# Patient Record
(demographics unavailable — no encounter records)

---

## 2025-03-20 NOTE — PHYSICAL EXAM
[Alert] : alert [Normal Voice/Communication] : normal voice/communication [Healthy Appearing] : healthy appearing [No Acute Distress] : no acute distress [Sclera] : the sclera and conjunctiva were normal [Hearing Threshold Finger Rub Not [place]] : hearing was normal [Normal Lips/Gums] : the lips and gums were normal [Oropharynx] : the oropharynx was normal [Normal Appearance] : the appearance of the neck was normal [No Neck Mass] : no neck mass was observed [No Respiratory Distress] : no respiratory distress [No Acc Muscle Use] : no accessory muscle use [Respiration, Rhythm And Depth] : normal respiratory rhythm and effort [Auscultation Breath Sounds / Voice Sounds] : lungs were clear to auscultation bilaterally [Heart Rate And Rhythm] : heart rate was normal and rhythm regular [Normal S1, S2] : normal S1 and S2 [Murmurs] : no murmurs [Bowel Sounds] : normal bowel sounds [Abdomen Tenderness] : non-tender [No Masses] : no abdominal mass palpated [Abdomen Soft] : soft [] : no hepatosplenomegaly [Oriented To Time, Place, And Person] : oriented to person, place, and time [de-identified] : Large frame and build.  Obese.  In no distress. [FreeTextEntry1] : Anicteric sclera. [de-identified] : Moist mucosa. [de-identified] : Supple neck.  No adenopathy. [de-identified] : Clear to A&P. [de-identified] : No MRG. [de-identified] : No cyanosis clubbing or edema. [de-identified] : Obese.  Bowel sounds present.  No organomegaly.  No mass tenderness or rebound.  Surgical scars noted. [de-identified] : Not performed today.

## 2025-03-20 NOTE — ASSESSMENT
[FreeTextEntry1] : Predominantly diarrhea.  Moderate weight loss.  Negative imaging.  Negative prior blood work.  Unlikely to be C. difficile.  Possible medication related diarrhea, i.e. metformin. Advise stool studies to include fecal calprotectin, ova and parasites, C. difficile, GI PCR. Repeat blood work to include CBC, CMP, CRP, IBD panel, TSH with reflex, celiac disease antibody profile. GI office follow-up here in 2 months.

## 2025-03-20 NOTE — REASON FOR VISIT
[Follow-up] : a follow-up of an existing diagnosis [FreeTextEntry1] : Functional heartburn, diarrhea with constipation, weight loss.

## 2025-03-20 NOTE — HISTORY OF PRESENT ILLNESS
[de-identified] : 1/22   FK normal study.  Pathology all negative.  No H. pylori.  No intestinal metaplasia.  [FreeTextEntry1] : Colonoscopy 1/22.  Small 5 mm tubular adenoma removed from cecum.

## 2025-04-22 NOTE — DISCUSSION/SUMMARY
[EKG obtained to assist in diagnosis and management of assessed problem(s)] : EKG obtained to assist in diagnosis and management of assessed problem(s) [FreeTextEntry1] : 1. PFO closure: Discontinue asa due to ITP and risk of bleeding. TTE 2. Palpitations: no malignant features, she reports that she has them once per year.  3. Negative cardiac CTA.  Patient with snoring and frequent wake ups. Underwent sleep study. home. Watch pat.  Follow up in 1.5 years.

## 2025-04-22 NOTE — CARDIOLOGY SUMMARY
[de-identified] : 4/2024- SR  2/2024- SR 67bpm 2/2022- Sinus with no st-t wave abnormalities.  [de-identified] : 5/2023 CTA: calcium score 0

## 2025-04-22 NOTE — CARDIOLOGY SUMMARY
[de-identified] : 4/2024- SR  2/2024- SR 67bpm 2/2022- Sinus with no st-t wave abnormalities.  [de-identified] : 5/2023 CTA: calcium score 0

## 2025-04-22 NOTE — HISTORY OF PRESENT ILLNESS
[FreeTextEntry1] : Patient  with DM II, hyperlipidemia, TIA presenting to the office due to an abnormal TCD bubble study.  7/2014- Had difficulty breathing, difficulty speech- went to [facility] and underwent workup. Unclear if noted by MRI but patient has clinical symptoms of CVA. Improved symptoms during hospital stay. No residual deficit except mild memory loss. S/p PFO closure.  10/2019- Stable. No chest pain. Some bruising.   2/2024- No symptoms. No palpitations. Cardiac CTA negative last year.   4/2025- No cardiac symptoms. She reports occasional acid reflux.

## 2025-04-22 NOTE — END OF VISIT
[FreeTextEntry4] :  I, Ailin Delgado, am scribing for and in the presence of  in the following sections HISTORY OF PRESENT ILLNESSS, PAST MEDICAL/FAMILY/SOCIAL HISTORY; REVIEW OF SYSTEMS; VITAL SIGNS; PHYSICAL EXAM; ASSESSMENT/PLAN   [FreeTextEntry3] : I, Vishal Fulton , personally performed the services described in this documentation. All medical record entries made by the scribe were at my direction and in my presence. I have reviewed the chart and agree that the record reflects my personal performance and is accurate and complete.

## 2025-04-22 NOTE — HISTORY OF PRESENT ILLNESS
[FreeTextEntry1] : Patient  with DM II, hyperlipidemia, TIA presenting to the office due to an abnormal TCD bubble study.  7/2014- Had difficulty breathing, difficulty speech- went to [facility] and underwent workup. Unclear if noted by MRI but patient has clinical symptoms of CVA. Improved symptoms during hospital stay. No residual deficit except mild memory loss. S/p PFO closure.  10/2019- Stable. No chest pain. Some bruising.   2/2024- No symptoms. No palpitations. Cardiac CTA negative last year.   4/2025- No cardiac symptoms. She reports occasional acid reflux.

## 2025-05-13 NOTE — HISTORY OF PRESENT ILLNESS
[FreeTextEntry1] : Pt presenting today for a f.u visit for joint pain, fibromyalgia.  Last seen in 2023 and then lost to follow up. Chart reviewed since LV.  Was previously doing well on duloxetine, cyclobenzaprine. Off all medications since LV.  Today presenting with generalized diffuse arthralgias, myalgia, fatigue. Her pain starts in AM and lasts the whole day. Worse with activity and at the end of the day. Has pain " deep inside the bones" No swelling to the joints.  denies fever, chills, denies chest pain, chest palpitations, denies sob, denies nausea, vomiting, abdominal pain, rashes. No recent travel. No recent falls.  Currently taking NSAIDs/ Tylenol PRN for pain.

## 2025-05-13 NOTE — PHYSICAL EXAM
[General Appearance - Alert] : alert [General Appearance - In No Acute Distress] : in no acute distress [General Appearance - Well Nourished] : well nourished [General Appearance - Well Developed] : well developed [Sclera] : the sclera and conjunctiva were normal [PERRL With Normal Accommodation] : pupils were equal in size, round, and reactive to light [Extraocular Movements] : extraocular movements were intact [Outer Ear] : the ears and nose were normal in appearance [Examination Of The Oral Cavity] : the lips and gums were normal [Oropharynx] : the oropharynx was normal [Neck Appearance] : the appearance of the neck was normal [Jugular Venous Distention Increased] : there was no jugular-venous distention [Auscultation Breath Sounds / Voice Sounds] : lungs were clear to auscultation bilaterally [Heart Rate And Rhythm] : heart rate was normal and rhythm regular [Heart Sounds] : normal S1 and S2 [Heart Sounds Gallop] : no gallops [Murmurs] : no murmurs [Heart Sounds Pericardial Friction Rub] : no pericardial rub [Bowel Sounds] : normal bowel sounds [Abdomen Soft] : soft [Abdomen Tenderness] : non-tender [No CVA Tenderness] : no ~M costovertebral angle tenderness [No Spinal Tenderness] : no spinal tenderness [Skin Color & Pigmentation] : normal skin color and pigmentation [Skin Turgor] : normal skin turgor [] : no rash [Skin Lesions] : no skin lesions [Deep Tendon Reflexes (DTR)] : deep tendon reflexes were 2+ and symmetric [Sensation] : the sensory exam was normal to light touch and pinprick [No Focal Deficits] : no focal deficits [Oriented To Time, Place, And Person] : oriented to person, place, and time [Impaired Insight] : insight and judgment were intact [Affect] : the affect was normal [Mood] : the mood was normal [Abnormal Walk] : normal gait [Nail Clubbing] : no clubbing  or cyanosis of the fingernails [Involuntary Movements] : no involuntary movements were seen [Musculoskeletal - Swelling] : no joint swelling seen [Motor Tone] : muscle strength and tone were normal [FreeTextEntry1] : +diffuse soft tissue tenderness. +BL knee crepitus

## 2025-05-13 NOTE — ASSESSMENT
[FreeTextEntry1] : 49 year old female with PMH of ITP presents today for f/u visit. Has chronic hx of diffuse polyarthralgias, myalgias, fatigue, poor sleep. Prior labs unremarkable. Doing well on duloxetine 60 mg daily,  cyclobenzaprine 10mg daily prn at bedtime  Tolerating medication well. Denies side effects.   Fibromyalgia Lost to follow up since 2023. Off all medications  - labs as below - conservative treatment of the patient's condition- including rest, ice, heat, anti-inflammatory medications, activity modifications, home stretching and strengthening exercises daily. - will consider re starting medications pending lab results b- was doing well on duloxetine 60 mg daily, cyclobenzaprine 10mg daily prn at bedtime in the past - PT  Discussed treatment plan with the patient. The patient was given the opportunity to ask questions and all questions were answered to their satisfaction.

## 2025-05-14 NOTE — HISTORY OF PRESENT ILLNESS
[de-identified] : 1/22   FK normal study.  Pathology all negative.  No H. pylori.  No intestinal metaplasia.  [FreeTextEntry1] : Colonoscopy 1/22.  Small 5 mm tubular adenoma removed from cecum.

## 2025-05-14 NOTE — PHYSICAL EXAM
[Alert] : alert [Normal Voice/Communication] : normal voice/communication [Healthy Appearing] : healthy appearing [No Acute Distress] : no acute distress [Sclera] : the sclera and conjunctiva were normal [Hearing Threshold Finger Rub Not [place]] : hearing was normal [Normal Lips/Gums] : the lips and gums were normal [Oropharynx] : the oropharynx was normal [Normal Appearance] : the appearance of the neck was normal [No Neck Mass] : no neck mass was observed [No Respiratory Distress] : no respiratory distress [No Acc Muscle Use] : no accessory muscle use [Respiration, Rhythm And Depth] : normal respiratory rhythm and effort [Auscultation Breath Sounds / Voice Sounds] : lungs were clear to auscultation bilaterally [Heart Rate And Rhythm] : heart rate was normal and rhythm regular [Normal S1, S2] : normal S1 and S2 [Murmurs] : no murmurs [Bowel Sounds] : normal bowel sounds [Abdomen Tenderness] : non-tender [No Masses] : no abdominal mass palpated [Abdomen Soft] : soft [] : no hepatosplenomegaly [Oriented To Time, Place, And Person] : oriented to person, place, and time [de-identified] : Large frame and build.  Obese.  In no distress. [FreeTextEntry1] : Anicteric sclera. [de-identified] : Moist mucosa. [de-identified] : Supple neck.  No adenopathy. [de-identified] : Clear to A&P. [de-identified] : No MRG. [de-identified] : No cyanosis clubbing or edema. [de-identified] : Obese.  Bowel sounds present.  No organomegaly.  No mass tenderness or rebound.  Surgical scars noted. [de-identified] : Not performed today.

## 2025-05-14 NOTE — HISTORY OF PRESENT ILLNESS
[de-identified] : 1/22   FK normal study.  Pathology all negative.  No H. pylori.  No intestinal metaplasia.  [FreeTextEntry1] : Colonoscopy 1/22.  Small 5 mm tubular adenoma removed from cecum.

## 2025-05-14 NOTE — REASON FOR VISIT
[Follow-up] : a follow-up of an existing diagnosis [FreeTextEntry1] : Mostly chronic diarrhea and abdominal pain preceding bowel movement

## 2025-05-14 NOTE — ASSESSMENT
[FreeTextEntry1] : Diarrhea improved.  Not yet resolved.  Occasional constipation alternating with normal bowel movements.  Overall abdominal pain improved.  Workup today has been unrevealing.  Unlikely the patient has chronic pancreatic insufficiency due to her obesity. Advised performance of a fecal elastase to exclude CPI. If diarrhea persists beyond those results, then consider discontinuation of metformin.  Watch for any effects on bowel movements.  As currently metformin has been used chronically for her controlled diabetes would only discontinue this medication in conjunction with any other recommendations by primary care physician.  GI office follow-up here in 3 months.

## 2025-05-14 NOTE — PHYSICAL EXAM
[Alert] : alert [Normal Voice/Communication] : normal voice/communication [Healthy Appearing] : healthy appearing [No Acute Distress] : no acute distress [Sclera] : the sclera and conjunctiva were normal [Hearing Threshold Finger Rub Not [place]] : hearing was normal [Normal Lips/Gums] : the lips and gums were normal [Oropharynx] : the oropharynx was normal [Normal Appearance] : the appearance of the neck was normal [No Neck Mass] : no neck mass was observed [No Respiratory Distress] : no respiratory distress [No Acc Muscle Use] : no accessory muscle use [Respiration, Rhythm And Depth] : normal respiratory rhythm and effort [Auscultation Breath Sounds / Voice Sounds] : lungs were clear to auscultation bilaterally [Heart Rate And Rhythm] : heart rate was normal and rhythm regular [Normal S1, S2] : normal S1 and S2 [Murmurs] : no murmurs [Bowel Sounds] : normal bowel sounds [Abdomen Tenderness] : non-tender [No Masses] : no abdominal mass palpated [Abdomen Soft] : soft [] : no hepatosplenomegaly [Oriented To Time, Place, And Person] : oriented to person, place, and time [de-identified] : Large frame and build.  Obese.  In no distress. [FreeTextEntry1] : Anicteric sclera. [de-identified] : Moist mucosa. [de-identified] : Supple neck.  No adenopathy. [de-identified] : Clear to A&P. [de-identified] : No MRG. [de-identified] : No cyanosis clubbing or edema. [de-identified] : Obese.  Bowel sounds present.  No organomegaly.  No mass tenderness or rebound.  Surgical scars noted. [de-identified] : Not performed today.

## 2025-05-28 NOTE — HISTORY OF PRESENT ILLNESS
[Home] : at home, [unfilled] , at the time of the visit. [Medical Office: ([facility])___] : at the medical office located in  [Telehealth (audio & video)] : This visit was provided via telehealth using real-time 2-way audio visual technology. [Verbal consent obtained from patient] : the patient, [unfilled] [FreeTextEntry1] : Pt presenting today for a f.u visit for joint pain, fibromyalgia.  Last seen in 2023 and then lost to follow up. Chart reviewed since LV.  Recent labs from 05/2025 reviewed with pt in detail- low PLT count, otherwise labs unremarkable.  Was previously doing well on duloxetine, cyclobenzaprine. Off all medications since LV.  Today presenting with generalized diffuse arthralgias, myalgia, fatigue. Her pain starts in AM and lasts the whole day. Worse with activity and at the end of the day. Has pain " deep inside the bones" No swelling to the joints.  denies fever, chills, denies chest pain, chest palpitations, denies sob, denies nausea, vomiting, abdominal pain, rashes. No recent travel. No recent falls.  Currently taking NSAIDs/ Tylenol PRN for pain.

## 2025-05-28 NOTE — ASSESSMENT
[FreeTextEntry1] : 49 year old female with PMH of ITP presents today for f/u visit. Has chronic hx of diffuse polyarthralgias, myalgias, fatigue, poor sleep. Prior labs unremarkable. Doing well on duloxetine 60 mg daily, cyclobenzaprine 10mg daily prn at bedtime  Tolerating medication well. Denies side effects.   Fibromyalgia Lost to follow up since 2023. Off all medications  - labs as below - conservative treatment of the patient's condition- including rest, ice, heat, anti-inflammatory medications, activity modifications, home stretching and strengthening exercises daily. - will re start medications. Was doing well on duloxetine 60 mg daily, cyclobenzaprine 10mg daily prn at bedtime in the past - PT - encouraged compliance with medications and follow up visits  Discussed treatment plan with the patient. The patient was given the opportunity to ask questions and all questions were answered to their satisfaction.